# Patient Record
Sex: FEMALE | Race: WHITE | NOT HISPANIC OR LATINO | Employment: FULL TIME | URBAN - METROPOLITAN AREA
[De-identification: names, ages, dates, MRNs, and addresses within clinical notes are randomized per-mention and may not be internally consistent; named-entity substitution may affect disease eponyms.]

---

## 2023-05-19 PROBLEM — G43.009 MIGRAINE WITHOUT AURA: Status: ACTIVE | Noted: 2023-05-19

## 2023-05-19 PROBLEM — F41.9 ANXIETY: Status: ACTIVE | Noted: 2023-05-19

## 2023-05-19 RX ORDER — MULTIVITAMIN
1 TABLET ORAL DAILY
COMMUNITY

## 2023-05-19 ASSESSMENT — ENCOUNTER SYMPTOMS
COUGH: 0
SHORTNESS OF BREATH: 0
BRUISES/BLEEDS EASILY: 0
DYSURIA: 0
BACK PAIN: 0
DIZZINESS: 0
TROUBLE SWALLOWING: 0
CHILLS: 0
EYE REDNESS: 0
WOUND: 0
SORE THROAT: 0
VOMITING: 0
BLOOD IN STOOL: 0
POLYDIPSIA: 0
POLYPHAGIA: 0
HEADACHES: 0
HEMATURIA: 0
PALPITATIONS: 0
NECK PAIN: 0
APPETITE CHANGE: 0
HALLUCINATIONS: 0
FREQUENCY: 0
EYE DISCHARGE: 0
ABDOMINAL PAIN: 0
ADENOPATHY: 0
FEVER: 0
UNEXPECTED WEIGHT CHANGE: 0
FATIGUE: 0
EYE PAIN: 0
CONFUSION: 0

## 2023-05-19 NOTE — PROGRESS NOTES
Subjective   Patient ID: Karuna Cota is a 38 y.o. female who presents for Annual Exam. patient is not fasting. Patient does not see any other provider.  Is pt fasting? no  Does pt see any providers other than me- no    Tdap today    No care team member to display    HPI  Last labs-10/2021 cbc nl, ldl 100, cmp nl, tsh nl  Due for labs- all    Cholesterol   Date Value Ref Range Status   10/27/2021 189 0 - 199 mg/dL Final     Comment:     .      AGE      DESIRABLE   BORDERLINE HIGH   HIGH     0-19 Y     0 - 169       170 - 199     >/= 200    20-24 Y     0 - 189       190 - 224     >/= 225         >24 Y     0 - 199       200 - 239     >/= 240   **All ranges are based on fasting samples. Specific   therapeutic targets will vary based on patient-specific   cardiac risk.  .   Pediatric guidelines reference:Pediatrics 2011, 128(S5).   Adult guidelines reference: NCEP ATPIII Guidelines,     CHARMAINE 2001, 258:2486-97  .   Venipuncture immediately after or during the    administration of Metamizole may lead to falsely   low results. Testing should be performed immediately   prior to Metamizole dosing.       Triglycerides   Date Value Ref Range Status   10/27/2021 73 0 - 149 mg/dL Final     Comment:     .      AGE      DESIRABLE   BORDERLINE HIGH   HIGH     VERY HIGH   0 D-90 D    19 - 174         ----         ----        ----  91 D- 9 Y     0 -  74        75 -  99     >/= 100      ----    10-19 Y     0 -  89        90 - 129     >/= 130      ----    20-24 Y     0 - 114       115 - 149     >/= 150      ----         >24 Y     0 - 149       150 - 199    200- 499    >/= 500  .   Venipuncture immediately after or during the    administration of Metamizole may lead to falsely   low results. Testing should be performed immediately   prior to Metamizole dosing.       HDL   Date Value Ref Range Status   10/27/2021 74.8 mg/dL Final     Comment:     .      AGE      VERY LOW   LOW     NORMAL    HIGH       0-19 Y       < 35   < 40     40-45      ----    20-24 Y       ----   < 40       >45     ----      >24 Y       ----   < 40     40-60      >60  .       No results found for: LDL  TSH   Date Value Ref Range Status   10/27/2021 2.89 0.44 - 3.98 mIU/L Final     Comment:      TSH testing is performed using different testing    methodology at [facility] than at other    [facility] hospitals. Direct result comparisons should    only be made within the same method.       No components found for: A1C  No components found for: POCA1C  No results found for: ALBUR  No components found for: POCALBUR      Other concerns: in the past with low iron  Last wk, can feel heartbeat in ears and racing and happened a couple days ago too  Lost wt and not dieting; hasn't been under 130# in awhile  Lightheaded when get up from seated position      bps at home- none    ER/urgicare visits in the last year- none  Hospitalizations in the last year- none      last Pap- 10/2021; due 10/2026  H/o abn pap-none    FH ovarian, endometrial, cervical, uterine ca-none    Current birth control method-none,  had vasectomy  No change in contraception desired      FH br ca-none    FH colon ca-none    Exercise- none  Diet-grazer and then dinner   Body mass index is 20.5 kg/m².    last eye dr appt- glasses; 2023  No vision issues    last dental appt- going next wk    BMs-regular  Sleep-able to fall asleep and stay asleep; no snoring or apnea  no cp, swelling, sob, abd pain, n/v/d/c, blood in stool or black stools  STI testing including hiv (age 15-65) and hep c screening (18-79)-declines        Immunization History   Administered Date(s) Administered    Tdap 10/26/2013       fractures in lifetime-none      FH heart attack, heart surgery-dad-mi  FH stroke-pgf    The ASCVD Risk score (Linda AWAD, et al., 2019) failed to calculate for the following reasons:    The 2019 ASCVD risk score is only valid for ages 40 to 79        Review of Systems   Constitutional:  Negative for appetite  change, chills, fatigue, fever and unexpected weight change.   HENT:  Negative for congestion, ear pain, sore throat and trouble swallowing.    Eyes:  Negative for pain, discharge and redness.   Respiratory:  Negative for cough and shortness of breath.    Cardiovascular:  Negative for chest pain and palpitations.        Tachycardia     Gastrointestinal:  Negative for abdominal pain, blood in stool and vomiting.   Endocrine: Negative for polydipsia, polyphagia and polyuria.        Wt loss   Genitourinary:  Negative for dysuria, frequency, hematuria and urgency.   Musculoskeletal:  Negative for back pain and neck pain.   Skin:  Negative for rash and wound.   Allergic/Immunologic: Negative for immunocompromised state.   Neurological:  Positive for light-headedness. Negative for dizziness, syncope and headaches.   Hematological:  Negative for adenopathy. Does not bruise/bleed easily.   Psychiatric/Behavioral:  Negative for confusion and hallucinations.        Objective   Visit Vitals  /82 (BP Location: Right arm, Patient Position: Sitting, BP Cuff Size: Adult)   Pulse 80   Temp 36.5 °C (97.7 °F) (Temporal)   Resp 16      BP Readings from Last 3 Encounters:   05/22/23 122/82   04/11/22 126/78   10/26/21 124/74     Wt Readings from Last 3 Encounters:   05/22/23 57.6 kg (127 lb)   04/11/22 64.1 kg (141 lb 4 oz)   10/26/21 62.4 kg (137 lb 8 oz)           Physical Exam  Constitutional:       General: She is not in acute distress.     Appearance: Normal appearance. She is not ill-appearing.   HENT:      Head: Normocephalic.      Right Ear: Tympanic membrane, ear canal and external ear normal.      Left Ear: Tympanic membrane, ear canal and external ear normal.      Nose: Nose normal.      Mouth/Throat:      Mouth: Mucous membranes are moist.      Pharynx: Oropharynx is clear.   Eyes:      Extraocular Movements: Extraocular movements intact.      Conjunctiva/sclera: Conjunctivae normal.      Pupils: Pupils are equal,  round, and reactive to light.   Cardiovascular:      Rate and Rhythm: Normal rate and regular rhythm.      Heart sounds: Normal heart sounds. No murmur heard.  Pulmonary:      Effort: Pulmonary effort is normal. No respiratory distress.      Breath sounds: Normal breath sounds. No wheezing, rhonchi or rales.   Abdominal:      General: Bowel sounds are normal.      Palpations: Abdomen is soft. There is no mass.      Tenderness: There is no abdominal tenderness.   Musculoskeletal:         General: No swelling or tenderness. Normal range of motion.      Cervical back: Normal range of motion and neck supple.      Right lower leg: No edema.      Left lower leg: No edema.   Skin:     General: Skin is warm.      Findings: No rash.   Neurological:      General: No focal deficit present.      Mental Status: She is alert and oriented to person, place, and time.      Cranial Nerves: No cranial nerve deficit.      Motor: No weakness.   Psychiatric:         Mood and Affect: Mood normal.         Behavior: Behavior normal.       Assessment/Plan   Diagnoses and all orders for this visit:  Routine general medical examination at a health care facility  -     Comprehensive Metabolic Panel; Future  -     CBC and Auto Differential; Future  -     Lipid Panel; Future  -     TSH with reflex to Free T4 if abnormal; Future  Dizziness  -     ECG 12 lead  Fatigue, unspecified type  -     Vitamin B12; Future  -     Folate; Future  -     Iron and TIBC; Future  -     Ferritin; Future  -     Vitamin D 25-Hydroxy,Total; Future  BMI 20.0-20.9, adult  Other orders  -     Tdap vaccine, age 10 years and older (BOOSTRIX)

## 2023-05-22 ENCOUNTER — OFFICE VISIT (OUTPATIENT)
Dept: PRIMARY CARE | Facility: CLINIC | Age: 38
End: 2023-05-22
Payer: OTHER GOVERNMENT

## 2023-05-22 VITALS
HEIGHT: 66 IN | HEART RATE: 80 BPM | BODY MASS INDEX: 20.41 KG/M2 | WEIGHT: 127 LBS | DIASTOLIC BLOOD PRESSURE: 82 MMHG | RESPIRATION RATE: 16 BRPM | TEMPERATURE: 97.7 F | OXYGEN SATURATION: 98 % | SYSTOLIC BLOOD PRESSURE: 122 MMHG

## 2023-05-22 DIAGNOSIS — R00.0 TACHYCARDIA: ICD-10-CM

## 2023-05-22 DIAGNOSIS — R42 DIZZINESS: ICD-10-CM

## 2023-05-22 DIAGNOSIS — R53.83 FATIGUE, UNSPECIFIED TYPE: ICD-10-CM

## 2023-05-22 DIAGNOSIS — Z00.00 ROUTINE GENERAL MEDICAL EXAMINATION AT A HEALTH CARE FACILITY: Primary | ICD-10-CM

## 2023-05-22 DIAGNOSIS — R63.4 WEIGHT LOSS: ICD-10-CM

## 2023-05-22 PROCEDURE — 99395 PREV VISIT EST AGE 18-39: CPT | Performed by: NURSE PRACTITIONER

## 2023-05-22 PROCEDURE — 90715 TDAP VACCINE 7 YRS/> IM: CPT | Performed by: NURSE PRACTITIONER

## 2023-05-22 PROCEDURE — 3008F BODY MASS INDEX DOCD: CPT | Performed by: NURSE PRACTITIONER

## 2023-05-22 PROCEDURE — 93000 ELECTROCARDIOGRAM COMPLETE: CPT | Performed by: NURSE PRACTITIONER

## 2023-05-22 PROCEDURE — 90471 IMMUNIZATION ADMIN: CPT | Performed by: NURSE PRACTITIONER

## 2023-05-22 PROCEDURE — 1036F TOBACCO NON-USER: CPT | Performed by: NURSE PRACTITIONER

## 2023-05-22 ASSESSMENT — ENCOUNTER SYMPTOMS
ENDOCRINE COMMENTS: WT LOSS
LIGHT-HEADEDNESS: 1

## 2023-05-22 ASSESSMENT — PAIN SCALES - GENERAL: PAINLEVEL: 0-NO PAIN

## 2023-05-22 ASSESSMENT — PATIENT HEALTH QUESTIONNAIRE - PHQ9
2. FEELING DOWN, DEPRESSED OR HOPELESS: NOT AT ALL
SUM OF ALL RESPONSES TO PHQ9 QUESTIONS 1 AND 2: 0
1. LITTLE INTEREST OR PLEASURE IN DOING THINGS: NOT AT ALL

## 2023-05-22 NOTE — PATIENT INSTRUCTIONS
Ekg normal.  Call if sx worsen or change     Handouts given to pt:  physical handout      I recommend signing up for MyChart.    Labs:    You can use the lab in our building when fasting. The hrs are: Monday-Thursday, 7 a.m. - 6 p.m., Friday, 7 a.m. - 5 p.m., Saturday 8 a.m. - 12 noon.   No appt needed, BUT YOU DO NEED THE PAPER ORDER.    Fasting is no food, drink, gum or mints other than water for 12 hrs.   Results will be back in 2-3 business days for most labs. It is always recommended for any orders (labs, xrays, ultrasounds,MRI, ct scan, procedures etc) to check with your insurance provider for expected costs or expenses to you.         You will get your results via phone from my medical assistant if you do not have MyChart.  OR  You will get your results via MyChart    If a result is urgent, I will call to speak to you.    Vaccines:  ---- Tdap-today        General recommendations:  Exercise-cardio 4-5d/wk 30min each day  Diet-Breakfast-toast (my favorite Mary Guzman Delighful Multigrain or Homero's Killer Bread Good Seed thin-sliced)/bagel/English muffin-whole wheat flour as a 1st ingredient or cereal/oatmeal/granola bar-fiber 4g or more or protein like eggs or peanut butter; optional veggies  Lunch-protein, 1/2c carb or 2 slices bread, veg 1c  Dinner-protein, fist sized carb, veg 1c  Fruit 2 a day  Dairy 2 a day-milk, soy milk, almond milk, cheese, yogurt, cottage cheese  Snacks-Protein-hard boiled egg, nuts (walnuts/almonds/pecans/pistachios 1/4c), hummus, beef/deer jerky or meat sticks; vegetable, fruit, dairy-milk(1%, skim, almond, soy)/cheese (not a lot of cheddar)/yogurt (Greek is best-my favorite Dannon Fruit on the Bottom Greek)/cottage cheese 2%; triscuits/ popcorn/wheat thins have a lot of fiber; follow serving size on bag/box/container  increase water  Limit alcohol to 1 drink per day for women and 2 drinks per day for men (1 drink=12oz beer or 5oz wine or 1 1/2oz liquor)  Calcium: 500mg 1 twice a day if  age 50 and younger and 600mg 1 twice a day if over age 50 (calcium citrate can be taken without food)  Vitamin D: 800-5000 IU/day  Limit salt to <2300mg a day if age 50 and under and <1500mg a day if over age 50/have high bp or diabetes or kidney disease  Recommend folate for childbearing age women 0.4mg per day (can be found in a multivitamin)  Recommend 18mg/dL of iron a day if age 50 and under and 8mg/dL a day if over age 50; take on an empty stomach at bedtime  Use sunscreen   Wear seatbelt  Recommend safe sex practices: using condoms everytime you have sex, discuss with a new partner about their past partners/history of STDs/drug use, avoid drinking alcohol or using drugs as this increases the chance that you will participate in high-risk sex, for oral sex help protect your mouth by having your partner use a condom (male or female), women should not douche after sex, be aware of your partner's body and your body-look for signs of a sore, blister, rash, or discharge, and have regular exams and periodic tests for STDs.  No distracted driving  No driving when under influence of substances  Wear a seatbelt  Eye dr every 1-2yrs  Dentist every 6-12 mon  Tetanus shot every 10yrs  Recommend flu vaccine in the fall  Appt in 1 year for physical      I will communicate with you via [employer] regarding messages and results. If you need help with this, you can call the support line at [phone].    IT WAS A PLEASURE TO SEE YOU TODAY. THANK YOU FOR CHOOSING US FOR YOUR HEALTHCARE NEEDS.

## 2023-05-24 DIAGNOSIS — D72.819 LEUKOPENIA, UNSPECIFIED TYPE: Primary | ICD-10-CM

## 2023-05-24 DIAGNOSIS — E55.9 VITAMIN D DEFICIENCY: ICD-10-CM

## 2023-05-24 DIAGNOSIS — D64.9 ANEMIA, UNSPECIFIED TYPE: ICD-10-CM

## 2023-05-24 LAB
NON-UH HIE A/G RATIO: 1.3
NON-UH HIE ALB: 4.1 G/DL (ref 3.4–5)
NON-UH HIE ALK PHOS: 51 UNIT/L (ref 46–116)
NON-UH HIE BASO COUNT: 0.05 X1000 (ref 0–0.2)
NON-UH HIE BASOS %: 1.2 %
NON-UH HIE BILIRUBIN, TOTAL: 0.5 MG/DL (ref 0.2–1)
NON-UH HIE BUN/CREAT RATIO: 15
NON-UH HIE BUN: 12 MG/DL (ref 9–23)
NON-UH HIE CALCIUM: 9.3 MG/DL (ref 8.7–10.4)
NON-UH HIE CALCULATED LDL CHOLESTEROL: 109 MG/DL (ref 60–130)
NON-UH HIE CALCULATED OSMOLALITY: 279 MOSM/KG (ref 275–295)
NON-UH HIE CHLORIDE: 108 MMOL/L (ref 98–107)
NON-UH HIE CHOLESTEROL: 186 MG/DL (ref 100–200)
NON-UH HIE CO2, VENOUS: 25 MMOL/L (ref 20–31)
NON-UH HIE CREATININE: 0.8 MG/DL (ref 0.5–0.8)
NON-UH HIE DIFF?: NO
NON-UH HIE DXH ACTIONS: ABNORMAL
NON-UH HIE EOS COUNT: 0.11 X1000 (ref 0–0.5)
NON-UH HIE EOSIN %: 2.5 %
NON-UH HIE FERRITIN: 6 NG/ML (ref 10–291)
NON-UH HIE FOLATE: 12.9 NG/ML (ref 5.4–17.5)
NON-UH HIE GFR AA: >60
NON-UH HIE GLOBULIN: 3.2 G/DL
NON-UH HIE GLOMERULAR FILTRATION RATE: >60 ML/MIN/1.73M?
NON-UH HIE GLUCOSE: 86 MG/DL (ref 74–106)
NON-UH HIE GOT: 23 UNIT/L (ref 15–37)
NON-UH HIE GPT: 19 UNIT/L (ref 10–49)
NON-UH HIE HCT: 37.4 % (ref 36–46)
NON-UH HIE HDL CHOLESTEROL: 64 MG/DL (ref 40–60)
NON-UH HIE HGB: 12.3 G/DL (ref 12–16)
NON-UH HIE INSTR WBC: 4.3
NON-UH HIE IRON: 51 UG/DL (ref 40–170)
NON-UH HIE K: 4.4 MMOL/L (ref 3.5–5.1)
NON-UH HIE LYMPH %: 39.2 %
NON-UH HIE LYMPH COUNT: 1.67 X1000 (ref 1.2–4.8)
NON-UH HIE MCH: 28.9 PG (ref 27–34)
NON-UH HIE MCHC: 32.8 G/DL (ref 32–37)
NON-UH HIE MCV: 87.9 FL (ref 80–100)
NON-UH HIE MONO %: 12 %
NON-UH HIE MONO COUNT: 0.51 X1000 (ref 0.1–1)
NON-UH HIE MPV: 9.1 FL (ref 7.4–10.4)
NON-UH HIE NA: 140 MMOL/L (ref 135–145)
NON-UH HIE NEUTROPHIL %: 45 %
NON-UH HIE NEUTROPHIL COUNT (ANC): 1.91 X1000 (ref 1.4–8.8)
NON-UH HIE NUCLEATED RBC: 0 /100WBC
NON-UH HIE PLATELET: 218 X10 (ref 150–450)
NON-UH HIE RBC: 4.25 X10 (ref 4.2–5.4)
NON-UH HIE RDW: 14.2 % (ref 11.5–14.5)
NON-UH HIE SATURATION: 12.7 % (ref 20–50)
NON-UH HIE TIBC: 401 UG/ML (ref 250–425)
NON-UH HIE TOTAL CHOL/HDL CHOL RATIO: 2.9
NON-UH HIE TOTAL PROTEIN: 7.3 G/DL (ref 5.7–8.2)
NON-UH HIE TRIGLYCERIDES: 67 MG/DL (ref 30–150)
NON-UH HIE TSH: 2.63 UIU/ML (ref 0.55–4.78)
NON-UH HIE VIT D 25: 15 NG/ML
NON-UH HIE VITAMIN B12: 378 PG/ML (ref 211–911)
NON-UH HIE WBC: 4.3 X10 (ref 4.5–11)

## 2023-05-24 RX ORDER — FERROUS SULFATE 325(65) MG
65 TABLET, DELAYED RELEASE (ENTERIC COATED) ORAL DAILY
Qty: 30 TABLET | Refills: 2
Start: 2023-05-24 | End: 2023-10-19 | Stop reason: ALTCHOICE

## 2023-05-24 RX ORDER — ERGOCALCIFEROL 1.25 MG/1
1 CAPSULE ORAL
Qty: 5 CAPSULE | Refills: 2 | Status: SHIPPED | OUTPATIENT
Start: 2023-05-24 | End: 2023-06-22

## 2023-05-25 ENCOUNTER — TELEPHONE (OUTPATIENT)
Dept: PRIMARY CARE | Facility: CLINIC | Age: 38
End: 2023-05-25
Payer: OTHER GOVERNMENT

## 2023-05-25 NOTE — TELEPHONE ENCOUNTER
----- Message from TABITHA Guzman-CNP sent at 5/24/2023  4:18 PM EDT -----  White blood cells a little low. Recheck in 1mon.  Sugar (aka glucose), kidney function, liver function and electrolytes in the CMP (comprehensive metabolic panel) were normal.  Ferritin is low. Iron is low. Start otc iron 1 a day. Recheck in 1mon.  Trigs and hdl normal.  Ldl slightly high at 109. Goal <100. Decr fats and incr fiber.   B12 normal  Folate normal.  Vitamin D is low. Goal is >30. Start Vitamin D prescription 1 a week. Prescription sent. Recheck lab in 2 months. No fasting needed.   This will be a lifetime medication.  TSH (thyroid test) was normal.

## 2023-06-08 ENCOUNTER — OFFICE VISIT (OUTPATIENT)
Dept: PRIMARY CARE | Facility: CLINIC | Age: 38
End: 2023-06-08
Payer: OTHER GOVERNMENT

## 2023-06-08 VITALS
DIASTOLIC BLOOD PRESSURE: 81 MMHG | BODY MASS INDEX: 20.34 KG/M2 | SYSTOLIC BLOOD PRESSURE: 127 MMHG | WEIGHT: 126 LBS | HEART RATE: 70 BPM | TEMPERATURE: 97.5 F

## 2023-06-08 DIAGNOSIS — H10.31 ACUTE BACTERIAL CONJUNCTIVITIS OF RIGHT EYE: Primary | ICD-10-CM

## 2023-06-08 PROCEDURE — 3008F BODY MASS INDEX DOCD: CPT | Performed by: NURSE PRACTITIONER

## 2023-06-08 PROCEDURE — 99213 OFFICE O/P EST LOW 20 MIN: CPT | Performed by: NURSE PRACTITIONER

## 2023-06-08 PROCEDURE — 1036F TOBACCO NON-USER: CPT | Performed by: NURSE PRACTITIONER

## 2023-06-08 RX ORDER — TOBRAMYCIN 3 MG/ML
2 SOLUTION/ DROPS OPHTHALMIC EVERY 4 HOURS
Qty: 5 ML | Refills: 0 | Status: SHIPPED | OUTPATIENT
Start: 2023-06-08 | End: 2023-06-15

## 2023-06-08 RX ORDER — PREDNISONE 20 MG/1
TABLET ORAL
Qty: 20 TABLET | Refills: 0 | Status: SHIPPED | OUTPATIENT
Start: 2023-06-08 | End: 2023-07-07 | Stop reason: ALTCHOICE

## 2023-06-08 ASSESSMENT — ENCOUNTER SYMPTOMS
CHILLS: 0
EYE REDNESS: 1
EYE ITCHING: 1
FEVER: 0
EYE DISCHARGE: 1
EYE PAIN: 1
SHORTNESS OF BREATH: 0
WHEEZING: 0

## 2023-06-08 NOTE — PATIENT INSTRUCTIONS
Recheck blood counts and iron in 2wks.  Recheck vit d in 6wks.     Let me know about further wt loss---->gi dr referral    Prednisone  No advil, motrin, ibuprofen, aleve, naproxen or other anti-inflammatories while on prednisone.  Tylenol (acetaminophen) is OK to take.   Eye drop rx  Warm compresses  To eye dr if not starting to get better in 2-3d  To ER if eye pain worsens or change in vision.    Return in May for wellness appt      I will communicate with you via [employer] regarding messages and results. If you need help with this, you can call the support line at [phone].    IT WAS A PLEASURE TO SEE YOU TODAY. THANK YOU FOR CHOOSING US FOR YOUR HEALTHCARE NEEDS.

## 2023-06-08 NOTE — PROGRESS NOTES
Subjective   Patient ID: Karuna Cota is a 38 y.o. female who presents for Follow-up (Current sx cant really open right eye,is itchy,watery,and red,swollen /Sx started yesterday afternoon ).  Last physical:  5/22/23 -due 1yr    HPI  Rt eye matted shut with itching, watering, redness and swelling x 1d  No eyeball pain, no vision change  No fever or chills  No one around pt with similar sx  Selftxt:none    Visited family out of town last wk in [place]  Ate a lot but did not gain wt    No care team member to display     Review of Systems   Constitutional:  Negative for chills and fever.   Eyes:  Positive for pain, discharge, redness and itching.        Rt eye watery  Swollen upper and lower rt lids   Respiratory:  Negative for shortness of breath and wheezing.    Cardiovascular:  Negative for chest pain.       Objective   Visit Vitals  /81   Pulse 70   Temp 36.4 °C (97.5 °F)      BP Readings from Last 3 Encounters:   06/08/23 127/81   05/22/23 122/82   04/11/22 126/78     Wt Readings from Last 3 Encounters:   06/08/23 57.2 kg (126 lb)   05/22/23 57.6 kg (127 lb)   04/11/22 64.1 kg (141 lb 4 oz)       Physical Exam  Constitutional:       General: She is not in acute distress.     Appearance: Normal appearance.   HENT:      Head: Normocephalic.   Eyes:      General:         Right eye: Discharge present.      Extraocular Movements: Extraocular movements intact.      Pupils: Pupils are equal, round, and reactive to light.      Comments: Rt eye watery and conjunctiva red. Rt upper and lower lids red and swollen.   Neurological:      Mental Status: She is alert.       Assessment/Plan   Diagnoses and all orders for this visit:  Acute bacterial conjunctivitis of right eye  Comments:  vs allergic conjunctivitis  Orders:  -     tobramycin (Tobrex) 0.3 % ophthalmic solution; Administer 2 drops into both eyes every 4 hours for 7 days.  -     predniSONE (Deltasone) 20 mg tablet; 3 tabs daily x3d then 2 tabs daily x 3d then 1  tab daily x 3d then 1/2 tab daily x 3d with food  Other orders  -     Follow Up In Primary Care; Future

## 2023-06-23 LAB
NON-UH HIE BASO COUNT: 0.08 X1000 (ref 0–0.2)
NON-UH HIE BASOS %: 1 %
NON-UH HIE DIFF?: NO
NON-UH HIE EOS COUNT: 0.07 X1000 (ref 0–0.5)
NON-UH HIE EOSIN %: 0.8 %
NON-UH HIE FERRITIN: 37 NG/ML (ref 10–291)
NON-UH HIE HCT: 42.5 % (ref 36–46)
NON-UH HIE HGB: 14 G/DL (ref 12–16)
NON-UH HIE INSTR WBC: 8.6
NON-UH HIE IRON: 166 UG/DL (ref 40–170)
NON-UH HIE LYMPH %: 21.4 %
NON-UH HIE LYMPH COUNT: 1.84 X1000 (ref 1.2–4.8)
NON-UH HIE MCH: 29.4 PG (ref 27–34)
NON-UH HIE MCHC: 32.9 G/DL (ref 32–37)
NON-UH HIE MCV: 89.3 FL (ref 80–100)
NON-UH HIE MONO %: 10 %
NON-UH HIE MONO COUNT: 0.86 X1000 (ref 0.1–1)
NON-UH HIE MPV: 8.6 FL (ref 7.4–10.4)
NON-UH HIE NEUTROPHIL %: 66.8 %
NON-UH HIE NEUTROPHIL COUNT (ANC): 5.76 X1000 (ref 1.4–8.8)
NON-UH HIE NUCLEATED RBC: 0 /100WBC
NON-UH HIE PLATELET: 262 X10 (ref 150–450)
NON-UH HIE RBC: 4.76 X10 (ref 4.2–5.4)
NON-UH HIE RDW: 16.5 % (ref 11.5–14.5)
NON-UH HIE RED BLOOD CELL MORPHOLOGY: ABNORMAL
NON-UH HIE TIBC: 350 UG/ML (ref 250–425)
NON-UH HIE WBC: 8.6 X10 (ref 4.5–11)

## 2023-07-07 ENCOUNTER — OFFICE VISIT (OUTPATIENT)
Dept: PRIMARY CARE | Facility: CLINIC | Age: 38
End: 2023-07-07
Payer: OTHER GOVERNMENT

## 2023-07-07 VITALS
HEART RATE: 64 BPM | DIASTOLIC BLOOD PRESSURE: 73 MMHG | SYSTOLIC BLOOD PRESSURE: 111 MMHG | BODY MASS INDEX: 20.09 KG/M2 | WEIGHT: 125 LBS | HEIGHT: 66 IN | TEMPERATURE: 98.1 F

## 2023-07-07 DIAGNOSIS — H10.31 ACUTE BACTERIAL CONJUNCTIVITIS OF RIGHT EYE: ICD-10-CM

## 2023-07-07 PROCEDURE — 3008F BODY MASS INDEX DOCD: CPT | Performed by: NURSE PRACTITIONER

## 2023-07-07 PROCEDURE — 99213 OFFICE O/P EST LOW 20 MIN: CPT | Performed by: NURSE PRACTITIONER

## 2023-07-07 PROCEDURE — 1036F TOBACCO NON-USER: CPT | Performed by: NURSE PRACTITIONER

## 2023-07-07 RX ORDER — PREDNISONE 20 MG/1
TABLET ORAL
Qty: 20 TABLET | Refills: 0 | Status: SHIPPED | OUTPATIENT
Start: 2023-07-07 | End: 2023-10-19 | Stop reason: ALTCHOICE

## 2023-07-07 ASSESSMENT — ENCOUNTER SYMPTOMS
WHEEZING: 0
BACK PAIN: 1
SHORTNESS OF BREATH: 0
CONSTITUTIONAL NEGATIVE: 1

## 2023-07-07 ASSESSMENT — PATIENT HEALTH QUESTIONNAIRE - PHQ9
1. LITTLE INTEREST OR PLEASURE IN DOING THINGS: NOT AT ALL
SUM OF ALL RESPONSES TO PHQ9 QUESTIONS 1 AND 2: 0
2. FEELING DOWN, DEPRESSED OR HOPELESS: NOT AT ALL

## 2023-07-07 NOTE — PROGRESS NOTES
Subjective   Patient ID: Karuna Cota is a 38 y.o. female who presents for Back Pain (6/24 was cleaning house bending over picking things up.  Then went on an 8 hour car ride, it started to get better.  Yesterday it started to flare up and today got worse.  Feels a pinching sensation in the left low back. ).  Last physical: 5/22/23-due in 1yr    HPI  Back pain bilat lower 6/24/23 after cleaning house-bending forward to pick things up then went on an 8hr car ride. Bad pain x 3d then more mobile. Better when drove home 6/29/23.  Better when standing  Worse when sitting  Yesterday flared up just lf lower side and today worse  Sharp pains with mvmt with constant dull pinching pain  no fall or injury  no new work activities or exercise routine  pain right now 6/10  pain at worst with mvmt-like getting into a car  no redness, rash, warmth, bruising or swelling  no numbness, tingling, or weakness  no pain down the lf leg but feels like getting a calf cramp  not feel like leg is going to give out  able to bear wt  no wt loss, fever, or chills  no new bowel or bladder problems-no urinary incontinence or urinary retention or fecal incontinence; no uti sx; no abd pain; no progressive weakness or impaired coordination  no recent spinal procedure or IV drug use  No back surgery  selftxt: aleve, muscle relaxant      No care team member to display     Review of Systems   Constitutional: Negative.    Respiratory:  Negative for shortness of breath and wheezing.    Cardiovascular:  Negative for chest pain.   Musculoskeletal:  Positive for back pain.       Objective   Visit Vitals  /73   Pulse 64   Temp 36.7 °C (98.1 °F)      BP Readings from Last 3 Encounters:   07/07/23 111/73   06/08/23 127/81   05/22/23 122/82     Wt Readings from Last 3 Encounters:   07/07/23 56.7 kg (125 lb)   06/08/23 57.2 kg (126 lb)   05/22/23 57.6 kg (127 lb)       Physical Exam  Constitutional:       General: She is not in acute distress.      Appearance: Normal appearance.   Musculoskeletal:      Comments: Lf lower back with no redness rash or swelling. No pain to palpation. Decr ROM bending forward. Pos SLR   Neurological:      Mental Status: She is alert.       Assessment/Plan   Diagnoses and all orders for this visit:  Acute bacterial conjunctivitis of right eye  Comments:  vs allergic conjunctivitis  Orders:  -     predniSONE (Deltasone) 20 mg tablet; 3 tabs daily x3d then 2 tabs daily x 3d then 1 tab daily x 3d then 1/2 tab daily x 3d with food  Other orders  -     Follow Up In Primary Care - Health Maintenance; Future

## 2023-07-07 NOTE — PATIENT INSTRUCTIONS
rest  Ice or heat  No advil, motrin, ibuprofen, aleve, naproxen, voltaren, diclofenac or other anti-inflammatories while on meloxicam (mobic); if you are on a baby aspirin 81mg, then it ok to take that. Tylenol (acetaminophen) is ok to take.  prednisone  stretches-handout given-start on sunday  call if symptoms worsen or change especially worsening pain or not starting to get better in 2wks  ----->physical therapy, mri, ortho dr referral    Return in may for wellness appt      I will communicate with you via [employer] regarding messages and results. If you need help with this, you can call the support line at [phone].    IT WAS A PLEASURE TO SEE YOU TODAY. THANK YOU FOR CHOOSING US FOR YOUR HEALTHCARE NEEDS.

## 2023-07-26 DIAGNOSIS — E55.9 VITAMIN D DEFICIENCY: Primary | ICD-10-CM

## 2023-07-26 LAB — NON-UH HIE VIT D 25: 57 NG/ML

## 2023-07-26 RX ORDER — ERGOCALCIFEROL 1.25 MG/1
1 CAPSULE ORAL
Qty: 5 CAPSULE | Refills: 12 | Status: SHIPPED | OUTPATIENT
Start: 2023-07-26 | End: 2023-10-19 | Stop reason: SDUPTHER

## 2023-10-19 ENCOUNTER — OFFICE VISIT (OUTPATIENT)
Dept: PRIMARY CARE | Facility: CLINIC | Age: 38
End: 2023-10-19
Payer: OTHER GOVERNMENT

## 2023-10-19 ENCOUNTER — HOSPITAL ENCOUNTER (OUTPATIENT)
Dept: RADIOLOGY | Facility: EXTERNAL LOCATION | Age: 38
Discharge: HOME | End: 2023-10-19

## 2023-10-19 VITALS
HEART RATE: 89 BPM | BODY MASS INDEX: 19.25 KG/M2 | WEIGHT: 119.8 LBS | HEIGHT: 66 IN | TEMPERATURE: 97.3 F | DIASTOLIC BLOOD PRESSURE: 79 MMHG | OXYGEN SATURATION: 98 % | SYSTOLIC BLOOD PRESSURE: 124 MMHG

## 2023-10-19 DIAGNOSIS — R63.4 UNINTENTIONAL WEIGHT LOSS: ICD-10-CM

## 2023-10-19 DIAGNOSIS — R63.4 UNINTENTIONAL WEIGHT LOSS: Primary | ICD-10-CM

## 2023-10-19 DIAGNOSIS — E55.9 VITAMIN D DEFICIENCY: ICD-10-CM

## 2023-10-19 LAB
NON-UH HIE A/G RATIO: 1.6
NON-UH HIE ALB: 4.5 G/DL (ref 3.4–5)
NON-UH HIE ALK PHOS: 63 UNIT/L (ref 45–117)
NON-UH HIE B-TYPE NATRIURETIC PEPTIDE: 6 PG/ML (ref 0–100)
NON-UH HIE BASO COUNT: 0.06 X1000 (ref 0–0.2)
NON-UH HIE BASOS %: 0.8 %
NON-UH HIE BILIRUBIN, TOTAL: 0.6 MG/DL (ref 0.3–1.2)
NON-UH HIE BUN/CREAT RATIO: 14.4
NON-UH HIE BUN: 13 MG/DL (ref 9–23)
NON-UH HIE CALCIUM: 9.6 MG/DL (ref 8.7–10.4)
NON-UH HIE CALCULATED OSMOLALITY: 276 MOSM/KG (ref 275–295)
NON-UH HIE CHLORIDE: 103 MMOL/L (ref 98–107)
NON-UH HIE CO2, VENOUS: 27 MMOL/L (ref 20–31)
NON-UH HIE CREATININE: 0.9 MG/DL (ref 0.5–0.8)
NON-UH HIE DIFF?: NO
NON-UH HIE EOS COUNT: 0.03 X1000 (ref 0–0.5)
NON-UH HIE EOSIN %: 0.5 %
NON-UH HIE GFR AA: >60
NON-UH HIE GLOBULIN: 2.9 G/DL
NON-UH HIE GLOMERULAR FILTRATION RATE: >60 ML/MIN/1.73M?
NON-UH HIE GLUCOSE: 72 MG/DL (ref 74–106)
NON-UH HIE GOT: 25 UNIT/L (ref 15–37)
NON-UH HIE GPT: 25 UNIT/L (ref 10–49)
NON-UH HIE HCT: 41.6 % (ref 36–46)
NON-UH HIE HGB: 14.1 G/DL (ref 12–16)
NON-UH HIE INSTR WBC: 6.8
NON-UH HIE K: 4.3 MMOL/L (ref 3.5–5.1)
NON-UH HIE LYMPH %: 25.9 %
NON-UH HIE LYMPH COUNT: 1.76 X1000 (ref 1.2–4.8)
NON-UH HIE MCH: 31.9 PG (ref 27–34)
NON-UH HIE MCHC: 34 G/DL (ref 32–37)
NON-UH HIE MCV: 93.9 FL (ref 80–100)
NON-UH HIE MONO %: 8.9 %
NON-UH HIE MONO COUNT: 0.61 X1000 (ref 0.1–1)
NON-UH HIE MPV: 9.1 FL (ref 7.4–10.4)
NON-UH HIE NA: 139 MMOL/L (ref 135–145)
NON-UH HIE NEUTROPHIL %: 63.9 %
NON-UH HIE NEUTROPHIL COUNT (ANC): 4.36 X1000 (ref 1.4–8.8)
NON-UH HIE NUCLEATED RBC: 0 /100WBC
NON-UH HIE PLATELET: 225 X10 (ref 150–450)
NON-UH HIE RBC: 4.43 X10 (ref 4.2–5.4)
NON-UH HIE RDW: 12.4 % (ref 11.5–14.5)
NON-UH HIE SED RATE WESTERGREN: 12 MM/HR (ref 0–20)
NON-UH HIE TOTAL PROTEIN: 7.4 G/DL (ref 5.7–8.2)
NON-UH HIE TSH: 2.18 UIU/ML (ref 0.55–4.78)
NON-UH HIE WBC: 6.8 X10 (ref 4.5–11)
POC APPEARANCE, URINE: CLEAR
POC BILIRUBIN, URINE: NEGATIVE
POC BLOOD, URINE: NEGATIVE
POC COLOR, URINE: YELLOW
POC GLUCOSE, URINE: NEGATIVE MG/DL
POC KETONES, URINE: NEGATIVE MG/DL
POC LEUKOCYTES, URINE: NEGATIVE
POC NITRITE,URINE: NEGATIVE
POC PH, URINE: 6 PH
POC PROTEIN, URINE: NEGATIVE MG/DL
POC SPECIFIC GRAVITY, URINE: 1.02
POC UROBILINOGEN, URINE: 0.2 EU/DL

## 2023-10-19 PROCEDURE — 81003 URINALYSIS AUTO W/O SCOPE: CPT | Performed by: NURSE PRACTITIONER

## 2023-10-19 PROCEDURE — 3008F BODY MASS INDEX DOCD: CPT | Performed by: NURSE PRACTITIONER

## 2023-10-19 PROCEDURE — 99214 OFFICE O/P EST MOD 30 MIN: CPT | Performed by: NURSE PRACTITIONER

## 2023-10-19 RX ORDER — ERGOCALCIFEROL 1.25 MG/1
1 CAPSULE ORAL
Qty: 5 CAPSULE | Refills: 12 | Status: SHIPPED | OUTPATIENT
Start: 2023-10-19 | End: 2025-01-10

## 2023-10-19 ASSESSMENT — ENCOUNTER SYMPTOMS
WHEEZING: 0
CONSTITUTIONAL NEGATIVE: 1
SHORTNESS OF BREATH: 0

## 2023-10-19 NOTE — PATIENT INSTRUCTIONS
Stool sample for blood in stool  Chest xray today  Labs today  Set up ultrasound pelvis appt  See gi dr  Consider hematology referral    Return in may for wellness      I will communicate with you via [employer] regarding messages and results. If you need help with this, you can call the support line at [phone].    IT WAS A PLEASURE TO SEE YOU TODAY. THANK YOU FOR CHOOSING US FOR YOUR HEALTHCARE NEEDS.        I will communicate with you via [employer] regarding messages and results. If you need help with this, you can call the support line at [phone].    IT WAS A PLEASURE TO SEE YOU TODAY. THANK YOU FOR CHOOSING US FOR YOUR HEALTHCARE NEEDS.

## 2023-10-19 NOTE — PROGRESS NOTES
Subjective   Patient ID: Karuna Cota is a 38 y.o. female who presents for No chief complaint on file..  Last physical: 5/22/23  Does pt want flu shot? no    Poc ua  No culture  Give pt fit test kit-explain how to do it    HPI  Wt 7/7/23 125#; today 119#  No vomiting  No decr appetite  No diarrhea  No bld in stool  No fever, chills or night sweats  Diet-2 meals and eats snacks (fruit, cheese stick)  Coffee,water, and coconut water    No care team member to display     Review of Systems   Constitutional: Negative.    Respiratory:  Negative for shortness of breath and wheezing.    Cardiovascular:  Negative for chest pain.       Objective   There were no vitals taken for this visit.   BP Readings from Last 3 Encounters:   07/07/23 111/73   06/08/23 127/81   05/22/23 122/82     Wt Readings from Last 3 Encounters:   07/07/23 56.7 kg (125 lb)   06/08/23 57.2 kg (126 lb)   05/22/23 57.6 kg (127 lb)       Physical Exam  Constitutional:       General: She is not in acute distress.     Appearance: Normal appearance.   Cardiovascular:      Rate and Rhythm: Normal rate and regular rhythm.      Heart sounds: Normal heart sounds. No murmur heard.  Pulmonary:      Effort: Pulmonary effort is normal.      Breath sounds: Normal breath sounds. No wheezing, rhonchi or rales.   Neurological:      Mental Status: She is alert.         Assessment/Plan   Diagnoses and all orders for this visit:  Unintentional weight loss  -     XR chest 2 views; Future  -     POCT UA Automated manually resulted; Future  -     Fecal Occult Blood Immunoassy; Future  -     Sedimentation Rate; Future  -     Comprehensive Metabolic Panel; Future  -     TSH with reflex to Free T4 if abnormal; Future  -     CBC and Auto Differential; Future  -     B-type natriuretic peptide; Future  -     Rheumatoid factor; Future  -     JOAN; Future  -     US pelvis; Future  -     ACTH; Future  -     Referral to Gastroenterology; Future  Vitamin D deficiency  -     ergocalciferol  (Vitamin D-2) 1.25 MG (92435 UT) capsule; Take 1 capsule (1,250 mcg) by mouth 1 (one) time per week for 65 doses.  Other orders  -     Follow Up In Primary Care - Health Maintenance; Future

## 2023-10-20 DIAGNOSIS — R63.4 UNINTENTIONAL WEIGHT LOSS: ICD-10-CM

## 2023-10-20 DIAGNOSIS — R76.8 ANA POSITIVE: Primary | ICD-10-CM

## 2023-10-20 LAB
NON-UH HIE ANA PATTERN: NORMAL
NON-UH HIE ANTI-NUCLEAR AB QUANT: NORMAL
NON-UH HIE ANTI-NUCLEAR ANTIBODY: POSITIVE

## 2023-10-21 LAB — NON-UH HIE ACTH: 8.7 PG/ML (ref 7.2–63.3)

## 2023-10-24 LAB — NON-UH HIE RHEUMATOID FACTOR: NEGATIVE

## 2023-10-25 ENCOUNTER — OFFICE VISIT (OUTPATIENT)
Dept: RHEUMATOLOGY | Facility: CLINIC | Age: 38
End: 2023-10-25
Payer: OTHER GOVERNMENT

## 2023-10-25 VITALS
SYSTOLIC BLOOD PRESSURE: 111 MMHG | WEIGHT: 120 LBS | BODY MASS INDEX: 19.99 KG/M2 | TEMPERATURE: 98.6 F | HEART RATE: 65 BPM | DIASTOLIC BLOOD PRESSURE: 76 MMHG | HEIGHT: 65 IN

## 2023-10-25 DIAGNOSIS — R63.4 UNINTENTIONAL WEIGHT LOSS: ICD-10-CM

## 2023-10-25 DIAGNOSIS — R76.8 ANA POSITIVE: ICD-10-CM

## 2023-10-25 PROCEDURE — 3008F BODY MASS INDEX DOCD: CPT | Performed by: INTERNAL MEDICINE

## 2023-10-25 PROCEDURE — 99203 OFFICE O/P NEW LOW 30 MIN: CPT | Performed by: INTERNAL MEDICINE

## 2023-10-25 RX ORDER — FOLIC ACID/MULTIVIT,IRON,MINER 0.4MG-18MG
1 TABLET ORAL DAILY
COMMUNITY
Start: 2021-10-26

## 2023-10-25 ASSESSMENT — PAIN SCALES - GENERAL: PAINLEVEL: 2

## 2023-10-26 LAB — NON-UH HIE ANTI-DNA: NEGATIVE

## 2023-10-26 NOTE — PROGRESS NOTES
Informants: Patient and EMR.  PP: 38-year-old female with no significant past medical history.  Chief complaint: Fatigue weight loss.  [place]: She had apparently been in her usual state of health until around 5/2023 when she noted rather significant fatigue and has had unintentional weight loss.  There was no associated Raynaud's phenomena, mucosal ulcerations, rash, abdominal pain, diarrhea, any excessive urination, joint swelling.  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  On evaluation of the weight loss, she underwent laboratory test that demonstrated normal thyroid function, slightly elevated serum creatinine, normal ESR and low titer positive JOAN 1: 80.  pH: Allergies: No known drug allergies, illnesses: Vitamin D deficiency; surgeries: Bladder surgery at 8 years of age for urinary incontinence/retention.  SH: Tobacco: She smokes tobacco occasionally but had quit from regular tobacco use.  Alcohol 3-5 drinks per week.  Drugs: She was marijuana and vapes.  She is employed doing a online content demonstrations.  She is  to her  who is in the  service and relocates to different states periodically.  FH: Her father had myocardial infarction.  Her mother has hypertension.  She has a sister with thyroid problems.  She has a 15-year-old son and a 10-year-old daughter both of whom are healthy.  She has no brothers.  PX: She is a thin, well-developed, well-nourished, white female.  The lungs, heart, abdomen, and extremities are benign.  The musculoskeletal examination does not show any synovitis.  Laboratory (10/19/2023) BUN 13, creatinine 0.9, glucose 72, alkaline phosphatase 63, SGOT 25, SGPT 25, albumin 4.5, calcium 9.6, ESR 12, JOAN 1: 80, rheumatoid factor negative, ACTH 8.7, TSH 2.18, WBC 6.8, hemoglobin 14.1, hematocrit 41.6, MCV 93.9, MCHC 34.0, platelets 225.  Impression: 38-year-old  white female without any significant past medical history with recently diagnosed vitamin D deficiency and positive JOAN on evaluation of unintentional weight loss.  She has no other extra-articular manifestations of connective tissue disease or spondylitis.  She has no known family history of inflammatory bowel disease, lupus, rheumatoid arthritis.  Plan: She is to have laboratory for QUANG panel, thyroid peroxidase antibody, and ANCA.  No medications were prescribed pending test results.  She is to return at the next available office appointment.

## 2023-10-27 LAB
Lab: 5 (ref 0–19)
NON-UH HIE SMOOTH MUSCLE AB, IGG TITER: NORMAL
NON-UH HIE THYROID (TPO) AB: 1.2 IU/ML (ref 0–9)

## 2023-10-28 LAB
Lab: 0 (ref 0–40)
Lab: 8 (ref 0–40)
NON-UH HIE MISC SENDOUT: NORMAL
NON-UH HIE SSA 52 (RO) (ENA) AB, IGG: 0 (ref 0–40)
NON-UH HIE SSA 60 (RO) (ENA) AB, IGG: 0 (ref 0–40)
NON-UH HIE SSB (LA) (ENA) AB, IGG: 0 (ref 0–40)

## 2023-10-31 PROCEDURE — 82274 ASSAY TEST FOR BLOOD FECAL: CPT

## 2023-11-02 ENCOUNTER — TELEPHONE (OUTPATIENT)
Dept: PRIMARY CARE | Facility: CLINIC | Age: 38
End: 2023-11-02
Payer: OTHER GOVERNMENT

## 2023-11-03 ENCOUNTER — LAB REQUISITION (OUTPATIENT)
Dept: LAB | Facility: HOSPITAL | Age: 38
End: 2023-11-03
Payer: OTHER GOVERNMENT

## 2023-11-03 DIAGNOSIS — R63.4 ABNORMAL WEIGHT LOSS: ICD-10-CM

## 2023-11-05 LAB — HEMOCCULT STL QL IA: NEGATIVE

## 2023-11-07 ENCOUNTER — LAB (OUTPATIENT)
Dept: LAB | Facility: LAB | Age: 38
End: 2023-11-07
Payer: OTHER GOVERNMENT

## 2023-11-07 ENCOUNTER — ANCILLARY PROCEDURE (OUTPATIENT)
Dept: RADIOLOGY | Facility: CLINIC | Age: 38
End: 2023-11-07
Payer: OTHER GOVERNMENT

## 2023-11-07 ENCOUNTER — OFFICE VISIT (OUTPATIENT)
Dept: GASTROENTEROLOGY | Facility: CLINIC | Age: 38
End: 2023-11-07
Payer: OTHER GOVERNMENT

## 2023-11-07 VITALS
DIASTOLIC BLOOD PRESSURE: 79 MMHG | HEART RATE: 81 BPM | HEIGHT: 66 IN | BODY MASS INDEX: 19.29 KG/M2 | WEIGHT: 120 LBS | SYSTOLIC BLOOD PRESSURE: 117 MMHG

## 2023-11-07 DIAGNOSIS — R63.4 UNINTENTIONAL WEIGHT LOSS: ICD-10-CM

## 2023-11-07 DIAGNOSIS — R63.4 UNINTENTIONAL WEIGHT LOSS: Primary | ICD-10-CM

## 2023-11-07 LAB
ALBUMIN SERPL BCP-MCNC: 4.7 G/DL (ref 3.4–5)
ALP SERPL-CCNC: 63 U/L (ref 33–110)
ALT SERPL W P-5'-P-CCNC: 30 U/L (ref 7–45)
ANION GAP SERPL CALC-SCNC: 14 MMOL/L (ref 10–20)
AST SERPL W P-5'-P-CCNC: 24 U/L (ref 9–39)
BILIRUB SERPL-MCNC: 0.7 MG/DL (ref 0–1.2)
BUN SERPL-MCNC: 11 MG/DL (ref 6–23)
CALCIUM SERPL-MCNC: 9.7 MG/DL (ref 8.6–10.3)
CHLORIDE SERPL-SCNC: 101 MMOL/L (ref 98–107)
CO2 SERPL-SCNC: 27 MMOL/L (ref 21–32)
CREAT SERPL-MCNC: 0.77 MG/DL (ref 0.5–1.05)
CRP SERPL-MCNC: 0.13 MG/DL
ERYTHROCYTE [SEDIMENTATION RATE] IN BLOOD BY WESTERGREN METHOD: 2 MM/H (ref 0–20)
FOLATE SERPL-MCNC: 12 NG/ML
GFR SERPL CREATININE-BSD FRML MDRD: >90 ML/MIN/1.73M*2
GLUCOSE SERPL-MCNC: 77 MG/DL (ref 74–99)
HCV AB SER QL: NONREACTIVE
POTASSIUM SERPL-SCNC: 4.9 MMOL/L (ref 3.5–5.3)
PREALB SERPL-MCNC: 28.3 MG/DL (ref 18–40)
PROT SERPL-MCNC: 7.3 G/DL (ref 6.4–8.2)
SODIUM SERPL-SCNC: 137 MMOL/L (ref 136–145)
TSH SERPL-ACNC: 2.35 MIU/L (ref 0.44–3.98)
TTG IGA SER IA-ACNC: <1 U/ML
VIT B12 SERPL-MCNC: 328 PG/ML (ref 211–911)

## 2023-11-07 PROCEDURE — 74018 RADEX ABDOMEN 1 VIEW: CPT | Mod: FY

## 2023-11-07 PROCEDURE — 3008F BODY MASS INDEX DOCD: CPT | Performed by: REGISTERED NURSE

## 2023-11-07 PROCEDURE — 85652 RBC SED RATE AUTOMATED: CPT

## 2023-11-07 PROCEDURE — 82607 VITAMIN B-12: CPT

## 2023-11-07 PROCEDURE — 36415 COLL VENOUS BLD VENIPUNCTURE: CPT

## 2023-11-07 PROCEDURE — 84443 ASSAY THYROID STIM HORMONE: CPT

## 2023-11-07 PROCEDURE — 82746 ASSAY OF FOLIC ACID SERUM: CPT

## 2023-11-07 PROCEDURE — 80053 COMPREHEN METABOLIC PANEL: CPT

## 2023-11-07 PROCEDURE — 86140 C-REACTIVE PROTEIN: CPT

## 2023-11-07 PROCEDURE — 86803 HEPATITIS C AB TEST: CPT

## 2023-11-07 PROCEDURE — 84134 ASSAY OF PREALBUMIN: CPT

## 2023-11-07 PROCEDURE — 74018 RADEX ABDOMEN 1 VIEW: CPT | Performed by: RADIOLOGY

## 2023-11-07 PROCEDURE — 83516 IMMUNOASSAY NONANTIBODY: CPT

## 2023-11-07 PROCEDURE — 99203 OFFICE O/P NEW LOW 30 MIN: CPT | Performed by: REGISTERED NURSE

## 2023-11-07 ASSESSMENT — ENCOUNTER SYMPTOMS
ABDOMINAL DISTENTION: 0
CONSTIPATION: 0
NERVOUS/ANXIOUS: 0
JOINT SWELLING: 0
ENDOCRINE NEGATIVE: 1
BLOOD IN STOOL: 0
DIFFICULTY URINATING: 0
COUGH: 0
APPETITE CHANGE: 0
UNEXPECTED WEIGHT CHANGE: 0
SHORTNESS OF BREATH: 0
ARTHRALGIAS: 0
EYE PAIN: 0
MYALGIAS: 0
EYES NEGATIVE: 1

## 2023-11-07 NOTE — PATIENT INSTRUCTIONS
ASSESSMENT    Referred for evaluation of unintentional weight loss over the past year.  I would like you to check blood tests and stool test to further evaluate your symptoms.     PLAN    Check LABS and stool tests to rule out inflammation or malabsorption  Referred to meet with a dietitian  Do abdominal x-ray today  I will let you know the results of the blood tests  Follow-up in the GI clinic in the next 3 months for weight check  >> EGD and colonoscopy if no improvement

## 2023-11-07 NOTE — PROGRESS NOTES
REASON FOR VISIT:  NPV referred by PCP for unintentional weight loss. Patient reports a 20 pound weight loss over the past year. No GI concerns at this time. Moves bowels 1x daily - denies blood in stool. Denies abd pain, NVD. Never had a colonoscopy or EGD.     HPI:  Karuna Cota is a 38 y.o. female who presents for weight loss.  She states that she lost approximately 20 pounds in the past year.  She sometimes skips breakfast and does not eat much for lunch.  She states that she has 1 meal at suppertime every day.  She works from home is  and has 3 children.  Denies any travel, recent viral illnesses or infection, changes in medication or drastic changes in her diet.      Patient denies heartburn, dysphagia, odynophagia, early satiety, epigastric pain, bloating, nausea, vomiting.  She moves her bowels usually daily.  She denies any change in her bowel pattern or stools.  She denies urgency after eating, nocturnal bowel movements, diarrhea, constipation, hematochezia or melena.    Denies chest pain syncopal episodes or exertional dyspnea.  She does report feeling tired.  Denies a history of anorexia or bulimia.  Had recent blood test done which were normal.  Recently saw a rheumatologist and had blood work done as well.    She denies a history of regular NSAID use.  She smokes a few cigarettes occasionally and is attempted to quit on a few occasions.  She drinks alcohol socially.  Denies a family history of colon cancer, other GI malignancies, IBD or celiac disease.  History of slight menstrual irregularity.  She has a pelvic ultrasound scheduled.  She had a negative chest x-ray recently.     Med list notable for -multivitamins, vitamin D    Labs  - Labs ordered 10/19/23 - CBC with differential, TSH, comprehensive metabolic panel, sedimentation rate - have not been collected as of this office visit     Fam Hx-denies family history of colon cancer in first-degree relative, GI malignancies, IBD or celiac  disease    Prev endoscopic eval:     >> Has never had an EGD or colonoscopy    REVIEW OF SYSTEMS    Review of Systems   Constitutional:  Negative for appetite change and unexpected weight change.   HENT:  Negative for mouth sores.    Eyes: Negative.  Negative for pain and visual disturbance.   Respiratory:  Negative for cough and shortness of breath.    Cardiovascular:  Negative for chest pain.   Gastrointestinal:  Negative for abdominal distention, blood in stool and constipation.   Endocrine: Negative.    Genitourinary:  Negative for difficulty urinating.   Musculoskeletal:  Negative for arthralgias, joint swelling and myalgias.   Skin:  Negative for rash.   Allergic/Immunologic: Negative for environmental allergies and food allergies.   Psychiatric/Behavioral:  The patient is not nervous/anxious.       No Known Allergies    No past medical history on file.    Past Surgical History:   Procedure Laterality Date    OTHER SURGICAL HISTORY  10/26/2021    Bladder surgery       Family History   Problem Relation Name Age of Onset    Anxiety disorder Mother      Anxiety disorder Father      Depression Father      Hyperlipidemia Father      Heart attack Father      Thyroid disease Sister      Anxiety disorder Maternal Grandfather      Osteoporosis Paternal Grandmother      Stroke Paternal Grandfather      Depression Paternal Grandfather         Social History     Tobacco Use    Smoking status: Some Days     Types: Cigarettes     Last attempt to quit:      Years since quittin.8    Smokeless tobacco: Never   Substance Use Topics    Alcohol use: Yes     Alcohol/week: 9.0 standard drinks of alcohol     Types: 3 Glasses of wine, 3 Cans of beer, 3 Shots of liquor per week       Current Outpatient Medications   Medication Sig Dispense Refill    ergocalciferol (Vitamin D-2) 1.25 MG (02937 UT) capsule Take 1 capsule (1,250 mcg) by mouth 1 (one) time per week for 65 doses. 5 capsule 12    multivitamin tablet Take 1 tablet  "by mouth once daily.      multivitamin with minerals (One Daily Women's) tablet Take 1 tablet by mouth once daily.       No current facility-administered medications for this visit.       PHYSICAL EXAM:  /79 (BP Location: Left arm, Patient Position: Sitting, BP Cuff Size: Adult)   Pulse 81   Ht 1.676 m (5' 6\")   Wt 54.4 kg (120 lb)   BMI 19.37 kg/m²      Physical Exam  Constitutional:       Appearance: Normal appearance.   HENT:      Head: Normocephalic and atraumatic.      Nose: Nose normal.   Eyes:      Pupils: Pupils are equal, round, and reactive to light.   Cardiovascular:      Rate and Rhythm: Normal rate and regular rhythm.   Pulmonary:      Effort: Pulmonary effort is normal.      Breath sounds: Normal breath sounds.   Abdominal:      General: Abdomen is flat. Bowel sounds are normal.      Palpations: Abdomen is soft.   Genitourinary:     Rectum: Normal.   Musculoskeletal:         General: Normal range of motion.      Cervical back: Normal range of motion and neck supple.   Skin:     General: Skin is warm and dry.   Neurological:      Mental Status: She is alert and oriented to person, place, and time.   Psychiatric:         Mood and Affect: Mood normal.         Behavior: Behavior normal.        ASSESSMENT    Referred for evaluation of unintentional weight loss over the past year.  You have not had any GI alarm symptoms.  I would like you to check blood tests and stool tests to further evaluate your symptoms.     PLAN    Check LABS and stool tests to rule out inflammation or malabsorption  Referred to meet with a dietitian  Do abdominal x-ray today  I will let you know the results of the blood tests  Follow-up in the GI clinic in the next 3 months for weight check  >> EGD and colonoscopy if no improvement  Called the GI clinic office at [phone] to schedule a follow-up appointment              Date: 11/7/2023  Time: 11:02 AM  "

## 2023-11-08 ENCOUNTER — APPOINTMENT (OUTPATIENT)
Dept: RADIOLOGY | Facility: CLINIC | Age: 38
End: 2023-11-08
Payer: OTHER GOVERNMENT

## 2024-05-01 ENCOUNTER — OFFICE VISIT (OUTPATIENT)
Dept: RHEUMATOLOGY | Facility: CLINIC | Age: 39
End: 2024-05-01
Payer: OTHER GOVERNMENT

## 2024-05-01 VITALS
SYSTOLIC BLOOD PRESSURE: 95 MMHG | BODY MASS INDEX: 20.82 KG/M2 | DIASTOLIC BLOOD PRESSURE: 64 MMHG | HEART RATE: 61 BPM | WEIGHT: 129 LBS

## 2024-05-01 DIAGNOSIS — R76.8 ANA POSITIVE: Primary | ICD-10-CM

## 2024-05-01 PROCEDURE — 99213 OFFICE O/P EST LOW 20 MIN: CPT | Performed by: INTERNAL MEDICINE

## 2024-05-01 PROCEDURE — 3008F BODY MASS INDEX DOCD: CPT | Performed by: INTERNAL MEDICINE

## 2024-05-01 NOTE — PROGRESS NOTES
He presents for follow-up evaluation and notes that he is no longer losing weight and has been able to gain some weight.  She has a longstanding history of easy bruising.  She does note some discoloration of the soles of her feet and some of her toes associated with numbness.  The discoloration occurred even without cold exposure.  She has episodes of lightheadedness but has not noted any dysphagia, mucosal ulcerations, rashes, muscle weakness.  She has history of right Planter fasciitis.    Examination shows normal active range of motion of the upper and lower extremity joints without joint effusions.  The slump test is normal.  The finger to toe distance is 0 cm.    Laboratory (11/7/2023) prealbumin 28.3, BUN 11, creatinine 0.77, glucose 77 vitamin B12 328, folate 12.0, CRP 0.13, TSH 2.35, ESR 2, tissue transglutaminase 80 < 1.0, hepatitis C antibody nonreactive, (10/31/2023) fecal occult blood negative, (10/2017 2023) thyroid peroxidase antibody 1.2, estimated <1: 20, QUANG panel negative.    She has history of fatigue and weight loss of unclear etiology.  She has a low titer positive JOAN without other corresponding symptoms to suggest connective tissue disease.    She is to have laboratory for her antichromatin, JOAN panel, dsDNA, CRP prior to next office visit.  She is to return in 6 months for follow-up evaluation.

## 2024-10-23 ENCOUNTER — TELEPHONE (OUTPATIENT)
Dept: PRIMARY CARE | Facility: CLINIC | Age: 39
End: 2024-10-23
Payer: COMMERCIAL

## 2024-10-23 DIAGNOSIS — E55.9 VITAMIN D DEFICIENCY: ICD-10-CM

## 2024-10-23 RX ORDER — ERGOCALCIFEROL 1.25 MG/1
CAPSULE ORAL
Qty: 5 CAPSULE | Refills: 0 | Status: SHIPPED | OUTPATIENT
Start: 2024-10-23

## 2024-11-04 ENCOUNTER — LAB (OUTPATIENT)
Dept: LAB | Facility: LAB | Age: 39
End: 2024-11-04
Payer: COMMERCIAL

## 2024-11-05 ENCOUNTER — LAB (OUTPATIENT)
Dept: LAB | Facility: LAB | Age: 39
End: 2024-11-05
Payer: COMMERCIAL

## 2024-11-05 DIAGNOSIS — R76.8 OTHER SPECIFIED ABNORMAL IMMUNOLOGICAL FINDINGS IN SERUM: Primary | ICD-10-CM

## 2024-11-05 LAB
BASOPHILS # BLD AUTO: 0.04 X10*3/UL (ref 0–0.1)
BASOPHILS NFR BLD AUTO: 0.7 %
EOSINOPHIL # BLD AUTO: 0.03 X10*3/UL (ref 0–0.7)
EOSINOPHIL NFR BLD AUTO: 0.5 %
ERYTHROCYTE [DISTWIDTH] IN BLOOD BY AUTOMATED COUNT: 11.7 % (ref 11.5–14.5)
HCT VFR BLD AUTO: 41.1 % (ref 36–46)
HGB BLD-MCNC: 13.6 G/DL (ref 12–16)
IMM GRANULOCYTES # BLD AUTO: 0.03 X10*3/UL (ref 0–0.7)
IMM GRANULOCYTES NFR BLD AUTO: 0.5 % (ref 0–0.9)
LYMPHOCYTES # BLD AUTO: 1.46 X10*3/UL (ref 1.2–4.8)
LYMPHOCYTES NFR BLD AUTO: 24 %
MCH RBC QN AUTO: 31.4 PG (ref 26–34)
MCHC RBC AUTO-ENTMCNC: 33.1 G/DL (ref 32–36)
MCV RBC AUTO: 95 FL (ref 80–100)
MONOCYTES # BLD AUTO: 0.46 X10*3/UL (ref 0.1–1)
MONOCYTES NFR BLD AUTO: 7.6 %
NEUTROPHILS # BLD AUTO: 4.06 X10*3/UL (ref 1.2–7.7)
NEUTROPHILS NFR BLD AUTO: 66.7 %
NRBC BLD-RTO: 0 /100 WBCS (ref 0–0)
PLATELET # BLD AUTO: 220 X10*3/UL (ref 150–450)
RBC # BLD AUTO: 4.33 X10*6/UL (ref 4–5.2)
WBC # BLD AUTO: 6.1 X10*3/UL (ref 4.4–11.3)

## 2024-11-05 PROCEDURE — 85025 COMPLETE CBC W/AUTO DIFF WBC: CPT

## 2024-11-05 PROCEDURE — 86140 C-REACTIVE PROTEIN: CPT

## 2024-11-05 PROCEDURE — 86235 NUCLEAR ANTIGEN ANTIBODY: CPT

## 2024-11-05 PROCEDURE — 86225 DNA ANTIBODY NATIVE: CPT

## 2024-11-05 PROCEDURE — 36415 COLL VENOUS BLD VENIPUNCTURE: CPT

## 2024-11-05 PROCEDURE — 80053 COMPREHEN METABOLIC PANEL: CPT

## 2024-11-06 ENCOUNTER — APPOINTMENT (OUTPATIENT)
Dept: RHEUMATOLOGY | Facility: CLINIC | Age: 39
End: 2024-11-06
Payer: OTHER GOVERNMENT

## 2024-11-06 DIAGNOSIS — R63.4 UNINTENTIONAL WEIGHT LOSS: Primary | ICD-10-CM

## 2024-11-06 LAB
ALBUMIN SERPL BCP-MCNC: 4.6 G/DL (ref 3.4–5)
ALP SERPL-CCNC: 49 U/L (ref 33–110)
ALT SERPL W P-5'-P-CCNC: 14 U/L (ref 7–45)
ANION GAP SERPL CALC-SCNC: 11 MMOL/L (ref 10–20)
AST SERPL W P-5'-P-CCNC: 19 U/L (ref 9–39)
BILIRUB SERPL-MCNC: 0.5 MG/DL (ref 0–1.2)
BUN SERPL-MCNC: 6 MG/DL (ref 6–23)
CALCIUM SERPL-MCNC: 9.5 MG/DL (ref 8.6–10.6)
CENTROMERE B AB SER-ACNC: <0.2 AI
CHLORIDE SERPL-SCNC: 103 MMOL/L (ref 98–107)
CHROMATIN AB SERPL-ACNC: <0.2 AI
CO2 SERPL-SCNC: 27 MMOL/L (ref 21–32)
CREAT SERPL-MCNC: 0.69 MG/DL (ref 0.5–1.05)
CRP SERPL-MCNC: <0.1 MG/DL
DSDNA AB SER-ACNC: <1 IU/ML
EGFRCR SERPLBLD CKD-EPI 2021: >90 ML/MIN/1.73M*2
ENA SS-A AB SER IA-ACNC: <0.2 AI
GLUCOSE SERPL-MCNC: 84 MG/DL (ref 74–99)
POTASSIUM SERPL-SCNC: 4.1 MMOL/L (ref 3.5–5.3)
PROT SERPL-MCNC: 7 G/DL (ref 6.4–8.2)
SODIUM SERPL-SCNC: 137 MMOL/L (ref 136–145)

## 2024-11-06 PROCEDURE — 99213 OFFICE O/P EST LOW 20 MIN: CPT | Performed by: INTERNAL MEDICINE

## 2024-11-06 RX ORDER — FERROUS GLUCONATE 256(28)MG
28 TABLET ORAL
COMMUNITY

## 2024-11-06 NOTE — PROGRESS NOTES
She reports that she was able to regain some weight initially.  However she has again lost weight without change in her appetite.  She does note that her bowel movements have been more loose.  She has irregular menstrual periods.  She has noted some episodes of lightheadedness associated with sweating and rapid heart rate but did not lose consciousness.  She has not noted any rashes.  She does note more frequent urination.  She has been taking vitamin D 50,000 units weekly because of low vitamin D level.    She has a thin body habitus.  The lungs, heart, abdomen, and extremities are benign.  The neurological examination shows normal muscle strength in upper and lower extremities.  The deep tendon reflexes 1+ at the patella bilaterally.  The musculoskeletal examination does not show any synovitis.  The slump test is normal.    Laboratory (11/5/2024) CRP <0.10, WBC 6.1, hemoglobin 13.6, hematocrit 41.1, MCV 95, MCHC 33.1, platelets 220, BUN 6, creatinine 0.69, glucose 84, sodium 137, potassium 4.1, chloride 103, bicarbonate 27, alkaline phosphatase 49, AST 19, ALT 14.    She has history of positive JOAN.  Laboratory for SSA, chromatin, dsDNA, and anticentromere antibodies are pending.  She has history of fatigue and weight loss (129 pounds on 5/1/2024 and 123 pounds on 11/16/2024).    She is to have laboratory for celiac panel.  May consider autonomic testing in the future.  She is to return at the next available office appointment.

## 2024-11-22 DIAGNOSIS — E55.9 VITAMIN D DEFICIENCY: ICD-10-CM

## 2024-11-22 LAB — NON-UH HIE MISC SENDOUT: NORMAL

## 2024-11-22 RX ORDER — ERGOCALCIFEROL 1.25 MG/1
CAPSULE ORAL
Qty: 12 CAPSULE | Refills: 4 | Status: SHIPPED | OUTPATIENT
Start: 2024-11-22

## 2025-01-06 PROBLEM — R76.8 POSITIVE ANA (ANTINUCLEAR ANTIBODY): Status: ACTIVE | Noted: 2025-01-06

## 2025-01-06 ASSESSMENT — ENCOUNTER SYMPTOMS
DYSURIA: 0
ADENOPATHY: 0
POLYPHAGIA: 0
EYE DISCHARGE: 0
EYE PAIN: 0
BRUISES/BLEEDS EASILY: 0
HEADACHES: 0
COUGH: 0
POLYDIPSIA: 0
FATIGUE: 0
PALPITATIONS: 0
ABDOMINAL PAIN: 0
BLOOD IN STOOL: 0
BACK PAIN: 0
EYE REDNESS: 0
CONFUSION: 0
SHORTNESS OF BREATH: 0
CHILLS: 0
HALLUCINATIONS: 0
SORE THROAT: 0
DIZZINESS: 0
FREQUENCY: 0
APPETITE CHANGE: 0
HEMATURIA: 0
NECK PAIN: 0
UNEXPECTED WEIGHT CHANGE: 0
WOUND: 0
FEVER: 0
VOMITING: 0
TROUBLE SWALLOWING: 0

## 2025-01-06 NOTE — PROGRESS NOTES
Subjective   Patient ID: Karuna Cota is a 39 y.o. female who presents for Annual Exam.     Is pt fasting? No   Does pt see any providers other than below?  aquiles Grimes    Any forms to fill out? No   Does pt want a flu shot? No   Did pt see dietician?  Does not think so   Did pt follow up with gi after 1st visit? No   Last mammo-  does not recall having   Is pt taking iron 1 a day consistently?  Pretty consistently   Taking vitamin d 1 a wk consistently  Any questions or concerns today? No       No care team member to display    HPI  Last labs-11/2024 cbc nl, cmp nl  10/2023 Sugar (aka glucose), kidney function, liver function and electrolytes in the CMP (comprehensive metabolic panel) were normal.  Sed rate, a general inflammation marker, is normal  TSH (thyroid test) was normal.  BNP, a congestive heart failure marker, is normal  CBC (complete blood count) was normal which looks at infection and anemia markers.  JOAN positive  RF neg  Acth nl  Tpo nl  No bld in stool  7/2023 vit d nl  6/2023 CBC (complete blood count) was normal which looks at infection and anemia markers.  Ferritin is normal  Iron normal  Continue taking otc iron  5/2023 White blood cells a little low. Recheck in 1mon.  Sugar (aka glucose), kidney function, liver function and electrolytes in the CMP (comprehensive metabolic panel) were normal.  Ferritin is low. Iron is low. Start otc iron 1 a day. Recheck in 1mon.  Trigs and hdl normal.  Ldl slightly high at 109. Goal <100. Decr fats and incr fiber.  B12 normal  Folate normal.  Vitamin D is low. Goal is >30. Start Vitamin D prescription 1 a week. Prescription sent. Recheck lab in 2 months. No fasting needed.  This will be a lifetime medication.  TSH (thyroid test) was normal.  10/2021 cbc nl, ldl 100, cmp nl, tsh nl  Due for labs- all    Cholesterol   Date Value Ref Range Status   10/27/2021 189 0 - 199 mg/dL Final     Comment:     .      AGE      DESIRABLE   BORDERLINE HIGH   HIGH     0-19 Y     " 0 - 169       170 - 199     >/= 200    20-24 Y     0 - 189       190 - 224     >/= 225         >24 Y     0 - 199       200 - 239     >/= 240   **All ranges are based on fasting samples. Specific   therapeutic targets will vary based on patient-specific   cardiac risk.  .   Pediatric guidelines reference:Pediatrics 2011, 128(S5).   Adult guidelines reference: NCEP ATPIII Guidelines,     CHARMAINE 2001, 258:2486-97  .   Venipuncture immediately after or during the    administration of Metamizole may lead to falsely   low results. Testing should be performed immediately   prior to Metamizole dosing.       Triglycerides   Date Value Ref Range Status   10/27/2021 73 0 - 149 mg/dL Final     Comment:     .      AGE      DESIRABLE   BORDERLINE HIGH   HIGH     VERY HIGH   0 D-90 D    19 - 174         ----         ----        ----  91 D- 9 Y     0 -  74        75 -  99     >/= 100      ----    10-19 Y     0 -  89        90 - 129     >/= 130      ----    20-24 Y     0 - 114       115 - 149     >/= 150      ----         >24 Y     0 - 149       150 - 199    200- 499    >/= 500  .   Venipuncture immediately after or during the    administration of Metamizole may lead to falsely   low results. Testing should be performed immediately   prior to Metamizole dosing.       HDL   Date Value Ref Range Status   10/27/2021 74.8 mg/dL Final     Comment:     .      AGE      VERY LOW   LOW     NORMAL    HIGH       0-19 Y       < 35   < 40     40-45     ----    20-24 Y       ----   < 40       >45     ----      >24 Y       ----   < 40     40-60      >60  .       No results found for: \"LDL\"  Thyroid Stimulating Hormone   Date Value Ref Range Status   11/07/2023 2.35 0.44 - 3.98 mIU/L Final     No results found for: \"A1C\"  No components found for: \"POCA1C\"  No results found for: \"ALBUR\"  No components found for: \"POCALBUR\"      Other concerns: 5/1/24 wt 129#, wt viacq=176#    bps at home- none    ER/urgicare visits in the last year- " none  Hospitalizations in the last year- none      last Pap- 10/2021; due 10/2026  H/o abn pap-none    FH ovarian, endometrial, cervical, uterine ca-none    Current birth control method-none,  had vasectomy  No change in contraception desired    Last mammo-none  FH br ca-none    FH colon ca-none    Exercise- none  Diet-3 meals   Breakfast with protein  Body mass index is 20.3 kg/m².    last eye dr appt- glasses; 2mon ago  No vision issues    last dental appt- jan 30th appt coming up    BMs-regular  Sleep-able to fall asleep and stay asleep; no snoring or apnea  no cp, swelling, sob, abd pain, n/v/d/c, blood in stool or black stools  STI testing including hiv (age 15-65) and hep c screening (18-79)-declines        Immunization History   Administered Date(s) Administered    Tdap vaccine, age 7 year and older (BOOSTRIX, ADACEL) 10/26/2013, 05/22/2023       Flu shot-declines    fractures in lifetime-none  Fh osteoporosis-pat gm      FH heart attack, heart surgery-dad-mi  FH stroke-pgf    The ASCVD Risk score (Linda DK, et al., 2019) failed to calculate for the following reasons:    Cannot find a previous HDL lab    Cannot find a previous total cholesterol lab        Review of Systems   Constitutional:  Negative for appetite change, chills, fatigue, fever and unexpected weight change.   HENT:  Negative for congestion, ear pain, sore throat and trouble swallowing.    Eyes:  Negative for pain, discharge and redness.   Respiratory:  Negative for cough and shortness of breath.    Cardiovascular:  Negative for chest pain and palpitations.   Gastrointestinal:  Negative for abdominal pain, blood in stool and vomiting.   Endocrine: Negative for polydipsia, polyphagia and polyuria.   Genitourinary:  Negative for dysuria, frequency, hematuria and urgency.   Musculoskeletal:  Negative for back pain and neck pain.   Skin:  Negative for rash and wound.   Allergic/Immunologic: Negative for immunocompromised state.    Neurological:  Negative for dizziness, syncope and headaches.   Hematological:  Negative for adenopathy. Does not bruise/bleed easily.   Psychiatric/Behavioral:  Negative for confusion and hallucinations.        Objective   Visit Vitals  /76   Pulse 76   Temp 36.7 °C (98 °F)        BP Readings from Last 3 Encounters:   01/08/25 116/76   05/01/24 95/64   11/07/23 117/79     Wt Readings from Last 3 Encounters:   01/08/25 57.1 kg (125 lb 12.8 oz)   05/01/24 58.5 kg (129 lb)   11/07/23 54.4 kg (120 lb)           Physical Exam  Constitutional:       General: She is not in acute distress.     Appearance: Normal appearance. She is not ill-appearing.   HENT:      Head: Normocephalic.      Right Ear: Tympanic membrane, ear canal and external ear normal.      Left Ear: Tympanic membrane, ear canal and external ear normal.      Nose: Nose normal.      Mouth/Throat:      Mouth: Mucous membranes are moist.      Pharynx: Oropharynx is clear.   Eyes:      Extraocular Movements: Extraocular movements intact.      Conjunctiva/sclera: Conjunctivae normal.      Pupils: Pupils are equal, round, and reactive to light.   Cardiovascular:      Rate and Rhythm: Normal rate and regular rhythm.      Heart sounds: Normal heart sounds. No murmur heard.  Pulmonary:      Effort: Pulmonary effort is normal. No respiratory distress.      Breath sounds: Normal breath sounds. No wheezing, rhonchi or rales.   Abdominal:      General: Bowel sounds are normal.      Palpations: Abdomen is soft. There is no mass.      Tenderness: There is no abdominal tenderness.   Musculoskeletal:         General: No swelling or tenderness. Normal range of motion.      Cervical back: Normal range of motion and neck supple.      Right lower leg: No edema.      Left lower leg: No edema.   Skin:     General: Skin is warm.      Findings: No rash.   Neurological:      General: No focal deficit present.      Mental Status: She is alert and oriented to person, place,  and time.      Cranial Nerves: No cranial nerve deficit.      Motor: No weakness.   Psychiatric:         Mood and Affect: Mood normal.         Behavior: Behavior normal.       Assessment/Plan   Diagnoses and all orders for this visit:  Routine general medical examination at a health care facility  -     Lipid Panel; Future  -     TSH with reflex to Free T4 if abnormal; Future  -     Glucose, fasting; Future  Encounter for screening mammogram for malignant neoplasm of breast  -     BI mammo bilateral screening tomosynthesis; Future  Vitamin D deficiency  -     Vitamin D 25-Hydroxy,Total (for eval of Vitamin D levels); Future

## 2025-01-08 ENCOUNTER — APPOINTMENT (OUTPATIENT)
Dept: PRIMARY CARE | Facility: CLINIC | Age: 40
End: 2025-01-08
Payer: COMMERCIAL

## 2025-01-08 ENCOUNTER — HOSPITAL ENCOUNTER (OUTPATIENT)
Dept: RADIOLOGY | Facility: EXTERNAL LOCATION | Age: 40
Discharge: HOME | End: 2025-01-08

## 2025-01-08 VITALS
OXYGEN SATURATION: 98 % | HEART RATE: 76 BPM | TEMPERATURE: 98 F | HEIGHT: 66 IN | SYSTOLIC BLOOD PRESSURE: 116 MMHG | BODY MASS INDEX: 20.22 KG/M2 | DIASTOLIC BLOOD PRESSURE: 76 MMHG | WEIGHT: 125.8 LBS

## 2025-01-08 DIAGNOSIS — E55.9 VITAMIN D DEFICIENCY: ICD-10-CM

## 2025-01-08 DIAGNOSIS — Z12.31 ENCOUNTER FOR SCREENING MAMMOGRAM FOR MALIGNANT NEOPLASM OF BREAST: ICD-10-CM

## 2025-01-08 DIAGNOSIS — Z00.00 ROUTINE GENERAL MEDICAL EXAMINATION AT A HEALTH CARE FACILITY: Primary | ICD-10-CM

## 2025-01-08 PROBLEM — F41.9 ANXIETY: Status: RESOLVED | Noted: 2023-05-19 | Resolved: 2025-01-08

## 2025-01-08 PROCEDURE — 99396 PREV VISIT EST AGE 40-64: CPT | Performed by: NURSE PRACTITIONER

## 2025-01-08 PROCEDURE — 3008F BODY MASS INDEX DOCD: CPT | Performed by: NURSE PRACTITIONER

## 2025-01-08 NOTE — PATIENT INSTRUCTIONS
See gi NP per rheum dr:  ZINA kwan  [facility]   [address]  [address]  [place] [address]  [phone]    Schedule mammogram    Continue with iron and vit d        Handouts given to pt:  physical handout        Labs- No appt needed:    You can use the lab in our building when fasting. The hrs are: Mon-Fri 7a-330p.  No Saturday hrs. Bring the paper order.   OR   [facility] Mon-Fri 7a-12p. No Saturday hrs. Bring the paper order.  OR   [facility] Mon-Fri 630a-530p or Sat 6:30a-12p. Bring the paper order  OR  [facility] Mon-FrI 7a-5p, Sat 8a-12p  [facility] Mon-Fri 730a-4p, Sat  8a-12p  [facility] [address] Mon-Thurs 630a-6p , Fri 630a-4p, Sat 8a-12p  [facility] [address] Mon-Fri 7a-630p and Sat. 7a-3p    Fasting is no food, drink, gum or mints other than water for 12 hrs.   Results will be back in 2-3 business days for most labs. It is always recommended for any orders (labs, xrays, ultrasounds,MRI, ct scan, procedures etc) to check with your insurance provider for expected costs or expenses to you.     Screenings:    To set up mammogram, call [phone]    You will get your results via phone from my medical assistant if you do not have MyChart.  OR  You will get your results via [employer]    If a result is urgent, I will call to speak to you.    Vaccines:  ---- flu vaccine-declines        General recommendations:  Exercise-cardio 4-5d/wk 30min each day  Diet-Breakfast-toast (my favorite Mary Guzman Delighful Multigrain or Homero's Killer Bread Good Seed thin-sliced)/bagel/English muffin-whole wheat flour as a 1st ingredient or cereal/oatmeal/granola bar-fiber 4g or more or protein like eggs or peanut butter; optional veggies  Lunch-protein, 1/2c carb or 2 slices bread, veg 1c  Dinner-protein, fist sized carb, veg 1c  Fruit 2 a day  Dairy 2 a day-milk, soy milk, almond milk, cheese, yogurt, cottage cheese  Snacks-Protein-hard  boiled egg, nuts (walnuts/almonds/pecans/pistachios 1/4c), hummus, beef/deer jerky or meat sticks; vegetable, fruit, dairy-milk(1%, skim, almond, soy)/cheese (not a lot of cheddar)/yogurt (Greek is best-my favorite Dannon Fruit on the Bottom Greek)/cottage cheese 2%; triscuits/ popcorn/wheat thins have a lot of fiber; follow serving size on bag/box/container  increase water  Limit alcohol to 1 drink per day for women and 2 drinks per day for men (1 drink=12oz beer or 5oz wine or 1 1/2oz liquor)  Calcium: 500mg 1 twice a day if age 50 and younger and 600mg 1 twice a day if over age 50 (calcium citrate can be taken without food)  Vitamin D: 800-5000 IU/day  Limit salt to <2300mg a day if age 50 and under and <1500mg a day if over age 50/have high bp or diabetes or kidney disease  Recommend folate for childbearing age women 0.4mg per day (can be found in a multivitamin)  Recommend 18mg/dL of iron a day if age 50 and under and 8mg/dL a day if over age 50; take on an empty stomach at bedtime  Use sunscreen   Wear seatbelt  Recommend safe sex practices: using condoms everytime you have sex, discuss with a new partner about their past partners/history of STDs/drug use, avoid drinking alcohol or using drugs as this increases the chance that you will participate in high-risk sex, for oral sex help protect your mouth by having your partner use a condom (male or female), women should not douche after sex, be aware of your partner's body and your body-look for signs of a sore, blister, rash, or discharge, and have regular exams and periodic tests for STDs.  No distracted driving  No driving when under influence of substances  Wear a seatbelt  Eye dr every 1-2yrs  Dentist every 6-12 mon  Tetanus shot every 10yrs  Recommend flu vaccine in the fall  Appt in  1 year for physical      I will communicate with you via [employer] regarding messages and results. If you need help with this, you can call the support line at  [phone].    IT WAS A PLEASURE TO SEE YOU TODAY. THANK YOU FOR CHOOSING US FOR YOUR HEALTHCARE NEEDS.

## 2025-02-21 LAB
NON-UH HIE CALCULATED LDL CHOLESTEROL: 117 MG/DL (ref 60–130)
NON-UH HIE CHOLESTEROL: 190 MG/DL (ref 100–200)
NON-UH HIE GLUCOSE: 80 MG/DL (ref 74–106)
NON-UH HIE HDL CHOLESTEROL: 58 MG/DL (ref 40–60)
NON-UH HIE TOTAL CHOL/HDL CHOL RATIO: 3.3
NON-UH HIE TRIGLYCERIDES: 75 MG/DL (ref 30–150)
NON-UH HIE TSH: 3.19 UIU/ML (ref 0.55–4.78)
NON-UH HIE VIT D 25: 42 NG/ML

## 2025-04-24 LAB
NON-UH HIE A/G RATIO: 1.2
NON-UH HIE ALB: 4.2 G/DL (ref 3.4–5)
NON-UH HIE ALK PHOS: 59 UNIT/L (ref 45–117)
NON-UH HIE BASO COUNT: 0.05 X1000 (ref 0–0.2)
NON-UH HIE BASOS %: 1 %
NON-UH HIE BILIRUBIN, TOTAL: 0.4 MG/DL (ref 0.3–1.2)
NON-UH HIE BUN/CREAT RATIO: 10
NON-UH HIE BUN: 8 MG/DL (ref 9–23)
NON-UH HIE C-REACTIVE PROTEIN, QUANTITATIVE: <0.5 MG/DL (ref 0–0.5)
NON-UH HIE CALCIUM: 9.9 MG/DL (ref 8.7–10.4)
NON-UH HIE CALCULATED OSMOLALITY: 279 MOSM/KG (ref 275–295)
NON-UH HIE CHLORIDE: 103 MMOL/L (ref 98–107)
NON-UH HIE CO2, VENOUS: 29 MMOL/L (ref 20–31)
NON-UH HIE CREATININE: 0.8 MG/DL (ref 0.5–0.8)
NON-UH HIE DIFF?: NORMAL
NON-UH HIE EOS COUNT: 0.11 X1000 (ref 0–0.5)
NON-UH HIE EOSIN %: 2.4 %
NON-UH HIE GFR AA: >60
NON-UH HIE GLOBULIN: 3.6 G/DL
NON-UH HIE GLOMERULAR FILTRATION RATE: >60 ML/MIN/1.73M?
NON-UH HIE GLUCOSE: 79 MG/DL (ref 74–106)
NON-UH HIE GOT: 29 UNIT/L (ref 15–37)
NON-UH HIE GPT: 23 UNIT/L (ref 10–49)
NON-UH HIE HCT: 42.4 % (ref 36–46)
NON-UH HIE HGB: 14 G/DL (ref 12–16)
NON-UH HIE INSTR WBC: 4.7
NON-UH HIE K: 4 MMOL/L (ref 3.5–5.1)
NON-UH HIE LYMPH %: 41.7 %
NON-UH HIE LYMPH COUNT: 1.95 X1000 (ref 1.2–4.8)
NON-UH HIE MCH: 31 PG (ref 27–34)
NON-UH HIE MCHC: 33 G/DL (ref 32–37)
NON-UH HIE MCV: 93.9 FL (ref 80–100)
NON-UH HIE MONO %: 10.2 %
NON-UH HIE MONO COUNT: 0.48 X1000 (ref 0.1–1)
NON-UH HIE MPV: 9 FL (ref 7.4–10.4)
NON-UH HIE NA: 141 MMOL/L (ref 135–145)
NON-UH HIE NEUTROPHIL %: 44.7 %
NON-UH HIE NEUTROPHIL COUNT (ANC): 2.09 X1000 (ref 1.4–8.8)
NON-UH HIE NUCLEATED RBC: 0 /100WBC
NON-UH HIE PLATELET: 239 X10 (ref 150–450)
NON-UH HIE RBC: 4.51 X10 (ref 4.2–5.4)
NON-UH HIE RDW: 12.7 % (ref 11.5–14.5)
NON-UH HIE TOTAL PROTEIN: 7.8 G/DL (ref 5.7–8.2)
NON-UH HIE WBC: 4.7 X10 (ref 4.5–11)

## 2025-04-25 LAB — NON-UH HIE ANTI-DNA: NORMAL

## 2025-04-26 LAB
Lab: 0 (ref 0–40)
NON-UH HIE SSA 52 (RO) (ENA) AB, IGG: 1 (ref 0–40)
NON-UH HIE SSA 60 (RO) (ENA) AB, IGG: 1 (ref 0–40)

## 2025-04-28 LAB — Lab: 6 (ref 0–19)

## 2025-05-21 ENCOUNTER — APPOINTMENT (OUTPATIENT)
Dept: RHEUMATOLOGY | Facility: CLINIC | Age: 40
End: 2025-05-21
Payer: COMMERCIAL

## 2025-05-21 VITALS
BODY MASS INDEX: 20.25 KG/M2 | WEIGHT: 126 LBS | HEIGHT: 66 IN | DIASTOLIC BLOOD PRESSURE: 83 MMHG | HEART RATE: 72 BPM | SYSTOLIC BLOOD PRESSURE: 122 MMHG

## 2025-05-21 DIAGNOSIS — I73.00 RAYNAUD'S DISEASE WITHOUT GANGRENE: ICD-10-CM

## 2025-05-21 DIAGNOSIS — R76.8 ANA POSITIVE: Primary | ICD-10-CM

## 2025-05-21 PROCEDURE — 3008F BODY MASS INDEX DOCD: CPT | Performed by: INTERNAL MEDICINE

## 2025-05-21 PROCEDURE — 99213 OFFICE O/P EST LOW 20 MIN: CPT | Performed by: INTERNAL MEDICINE

## 2025-05-21 ASSESSMENT — PAIN SCALES - GENERAL: PAINLEVEL_OUTOF10: 0-NO PAIN

## 2026-01-12 ENCOUNTER — APPOINTMENT (OUTPATIENT)
Dept: PRIMARY CARE | Facility: CLINIC | Age: 41
End: 2026-01-12
Payer: COMMERCIAL

## 2026-06-03 ENCOUNTER — APPOINTMENT (OUTPATIENT)
Dept: RHEUMATOLOGY | Facility: CLINIC | Age: 41
End: 2026-06-03
Payer: COMMERCIAL